# Patient Record
Sex: MALE | Race: WHITE | URBAN - METROPOLITAN AREA
[De-identification: names, ages, dates, MRNs, and addresses within clinical notes are randomized per-mention and may not be internally consistent; named-entity substitution may affect disease eponyms.]

---

## 2022-11-01 ENCOUNTER — OFFICE VISIT (OUTPATIENT)
Dept: DERMATOLOGY | Age: 42
End: 2022-11-01
Payer: COMMERCIAL

## 2022-11-01 DIAGNOSIS — L81.4 LENTIGINES: ICD-10-CM

## 2022-11-01 DIAGNOSIS — D22.9 MULTIPLE NEVI: Primary | ICD-10-CM

## 2022-11-01 DIAGNOSIS — L72.0 EPIDERMOID CYST: ICD-10-CM

## 2022-11-01 PROCEDURE — 99203 OFFICE O/P NEW LOW 30 MIN: CPT | Performed by: DERMATOLOGY

## 2022-11-01 NOTE — PROGRESS NOTES
[facility]  Brit Cortes MD  [address] [facility]  1980    43 y.o. male     Date of Visit: 11/1/2022    Last seen: new    Referred by: wife - Ricardo Lopez    Chief Complaint: moles/lesions  Chief Complaint   Patient presents with    Skin Exam     No hx of skin cancer-wife is Ricardo Lopez     History of Present Illness:    Here for evaluation of multiple asx pigmented lesions on the trunk and extremities, present for many years; no change in size/shape/color of any lesions; no bleeding lesions. He has an enlarging lesion on the upper back/base of neck that has been present for many years and desires removal.      No personal or family hx of skin cancer. Wears SPF when his wife makes him. Almost always wears pants.  + Sunburns as a child. Review of Systems:  Gen: Feels well, good sense of health. No F/C. Skin: No changing moles or lesions. No new rashes. Past Medical History, Family History, Surgical History, Medications and Allergies reviewed. No outpatient medications have been marked as taking for the 11/1/22 encounter (Office Visit) with Jose Osorio MD.     No Known Allergies    History reviewed. No pertinent past medical history. History reviewed. No pertinent surgical history. Physical Examination     Gen, well-appearing  FSE today    trunk and extremities with scattered brown macules and papules   Upper back/lower neck with 1.5 cm nodule with overlying pinpoint punctum    Assessment and Plan     1. Benign-appearing nevi and lentigines  - Monitor for ABCD's of MM and si/sx of NMSC  Continue sun protection - OTC sunscreen with SPF 30-50+ recommended and reviewed usage  Encouraged skin check yearly (sooner if indicated), self checks    2.  Epidermoid cyst - upper back/lower neck  - rtn for excision given growth and bothersome  - ed risk bleed, scar and procedure discussed

## 2023-02-09 ENCOUNTER — PROCEDURE VISIT (OUTPATIENT)
Dept: DERMATOLOGY | Age: 43
End: 2023-02-09

## 2023-02-09 DIAGNOSIS — L72.0 EPIDERMOID CYST: Primary | ICD-10-CM

## 2023-02-09 NOTE — PROGRESS NOTES
[facility]  Luna Perez MD  [address] [facility]  1980    37 y.o. male     Date of Visit: 2/9/2023    Last seen:     Referred by: wife - Aubrey Anthony    Chief Complaint: moles/lesions  Chief Complaint   Patient presents with    Procedure     Excision: Back Cyst/Below Neck     History of Present Illness:    He has an enlarging lesion on the upper back/base of neck that has been present for many years and desires removal d/t growth and occasional irritation. *no blood thinners, no pacemaker/defib or joint replacement    No personal or family hx of skin cancer. Wears SPF when his wife makes him. Almost always wears pants.  + Sunburns as a child. Review of Systems:  Gen: Feels well, good sense of health. Past Medical History, Family History, Surgical History, Medications and Allergies reviewed. No outpatient medications have been marked as taking for the 2/9/23 encounter (Procedure visit) with Kaleb City, MD.     No Known Allergies    No past medical history on file. No past surgical history on file.     Physical Examination     Gen, well-appearing    Upper back/lower neck with 1.8 cm nodule with overlying pinpoint punctum    Assessment and Plan     Epidermoid cyst - upper back/lower neck  - proceed with excision given growth and bothersome  - ed risk bleed, scar and procedure discussed  - excision today   educ risk bleed, infxn, scar   area anesth with lido with epi and prepped in sterile manner   ellipse excised to SQ with removal of entire cyst including wall and contents - 1.8 cm  specimen to path   edges undermined and hemostasis achieved with electrodessication   wound closed with layered closure 4-0 deep vicryl sutures and 4-0 running nylon suture  pressure dressing applied   pt educ re wnd care and suture removal     Final repair: 2.5 cm

## 2023-02-09 NOTE — PATIENT INSTRUCTIONS
Surgical Wound Care Instructions    Sutured Wounds:    After your surgery, go home and take it easy. Please refrain from any vigorous physical activity or heavy lifting for 14 days. Leave the pressure bandage in place for 48 hours. If it starts to detach, reinforce the bandage with another piece of tape. Please keep the bandage dry for 48 hours. After 48 hours, the wound can get wet. Clean the area daily using mild soapy water. Apply a thin layer of Aquaphor, Vaseline or petroleum jelly. Apply a non stick gauze pad or bandage to the surgical wound daily and secure with medical tape for 14 days. Special Sites:    Facial sites:    Keep the wound elevated for the first 2 nights. Do not sleep on the side of the body where the wound is located. Do not bend your head lower than your heart or engage in heavy lifting. Leg:    Keep the leg elevated when reclining, using a pillow to elevate the extremity. Complications:    If bleeding develops when you go home, apply pressure for 15 minutes continuously. A small amount of ice in a bag covered with a towel may be used for another 10 minutes if necessary.   If the bleeding does not stop, please call your dermatologist.    Call your doctor if you have any of these signs of infection:     Skin around the wound is more red, swollen or feels hot    Wound smells bad    Pus drainage    Fever    Increasing pain

## 2023-02-13 LAB — DERMATOLOGY PATHOLOGY REPORT: NORMAL
